# Patient Record
(demographics unavailable — no encounter records)

---

## 2024-10-09 NOTE — REVIEW OF SYSTEMS
[As Noted in HPI] : as noted in HPI [Dizziness] : dizziness [Vertigo] : vertigo [Difficulty Walking] : difficulty walking [Negative] : Heme/Lymph

## 2024-10-09 NOTE — PHYSICAL EXAM
[FreeTextEntry1] : Alert. Fully oriented. Speech and language are intact. Cranial nerves II-XII are intact. No nystagmus noted. Motor exam reveals intact strength with individual muscle testing in bilateral upper and lower extremities. Gait is normal.

## 2024-10-09 NOTE — HISTORY OF PRESENT ILLNESS
[FreeTextEntry1] : Farida Wynne is a 69 year old female with PMH T2DM, HTN and Meniere's disease who presents for neurological follow up of vertigo.  Interval History 10/9/24: MRI IAC w/wo from 6/2024 with no evidence for acoustic neuroma or other retrocochlear lesion. No evidence of intracranial mass or abnormal enhancement. She was seen by ENT who reports that her Meniere's is stable and was given a hearing aid for her left ear. She reports no further ED visits for dizziness and will have repeat blood work with her PCP. She continues to take Valium infrequently. She has not participated in vestibular therapy for over a year. __________________ CC: Vertigo x 2 weeks.  she had been better for her vertigo from her Meniere's disease but has gotten worse recently. a couple of months ago she developed  vomiting from her vertigo 2 months ago and had to go to the ER. She has been 3x this year already. She has not seen her ENT for awhile.  Increased stress and anxiety.  Has been having increased salt diets.  Took 2.5 mg valium prn today for the vertigo but still having active vertigo that developed in the waiting room.   Patient had hyponatremia - sodium 127 in february when she was in the ED for severe vertigo.

## 2024-10-09 NOTE — ASSESSMENT
[FreeTextEntry1] : Farida Wynne is a 69 year old female with PMH T2DM, HTN and Meniere's disease who presents for neurological follow up of vertigo.  Plan: -Patient will repeat CMP with PCP due abnormal potassium and glucose level in May  -Continue with ENT for known Meniere's disease -Referral to vestibular therapy -RTO in 6 months

## 2024-10-11 NOTE — HISTORY OF PRESENT ILLNESS
[FreeTextEntry1] : Farida Wynne is a 69 year old female with PMH T2DM, HTN and Meniere's disease who presents for neurological follow up of vertigo.  Interval History 10/9/24: MRI IAC w/wo from 6/2024 with no evidence for acoustic neuroma or other retrocochlear lesion. No evidence of intracranial mass or abnormal enhancement.  Repeat CMP: will complete in PCP ENT: saw recently, using hearing aid  Valium: only takes PRN Vestibular Therapy: has not completed in over a year    __________________ CC: Vertigo x 2 weeks.  she had been better for her vertigo from her Meniere's disease but has gotten worse recently. a couple of months ago she developed  vomiting from her vertigo 2 months ago and had to go to the ER. She has been 3x this year already. She has not seen her ENT for awhile.  Increased stress and anxiety.  Has been having increased salt diets.  Took 2.5 mg valium prn today for the vertigo but still having active vertigo that developed in the waiting room.   Patient had hyponatremia - sodium 127 in february when she was in the ED for severe vertigo.

## 2024-10-11 NOTE — ASSESSMENT
[FreeTextEntry1] : Farida Wynne is a 69 year old female with PMH T2DM, HTN and Meniere's disease who presents for neurological follow up of vertigo.  Plan: -Repeat CMP due potassium and glucose level -Follow up with ENT for known Meniere's disease -Referral to vestibular therapy -RTO in 6 months

## 2024-10-11 NOTE — ASSESSMENT
[FreeTextEntry1] : Farida Wnyne is a 69 year old female with PMH T2DM, HTN and Meniere's disease who presents for neurological follow up of vertigo.  Plan: -Repeat CMP due potassium and glucose level -Follow up with ENT for known Meniere's disease -Referral to vestibular therapy -RTO in 6 months

## 2024-10-11 NOTE — PHYSICAL EXAM
[FreeTextEntry1] : The patient is alert and oriented x3, naming intact x3, repetition normal, follows three-step commands, and is able to participate fully in the history taking. Speech is normal with no evidence of dysarthria. Memory is intact: Immediate recall 3 out of 3, short-term 3 out of 3, remote memory intact Cranial nerves II through XII intact except bidirectional nystagmus and vertical nystagmus.  Motor exam: Upper and lower extremities 5 out of 5 power, normal tone. No abnormal movements noted. Sensory exam: Intact to light touch and pinprick. Romberg negative. Coordination and vestibular exam: Finger to nose intact, no evidence of truncal or appendicular ataxia. No evidence of nystagmus. no vestibular symptoms elicited with head turning during ambulation. Gait: Broad based stance and unsteady gait.  Reflexes: One to 2+ in upper and lower extremities. No pathological reflexes. Downgoing toes.